# Patient Record
Sex: MALE | Race: WHITE
[De-identification: names, ages, dates, MRNs, and addresses within clinical notes are randomized per-mention and may not be internally consistent; named-entity substitution may affect disease eponyms.]

---

## 2017-04-02 NOTE — EDM.PDOC
ED HPI Skin/Rash





- General


Chief Complaint: Skin Complaint


Stated Complaint: POSS RASH


Time Seen by Provider: 04/02/17 21:50





- History of Present Illness


INITIAL COMMENTS - FREE TEXT/NARRATIVE: 





37-year-old male presents emergency room with a couple complaints.





First one is he is developing a boil on his right Buttocks. This has been 

getting more tender over the last several days he had a smaller lesion on his 

anterior chest that is resolving on its own. Patient has had problems with 

these in the past usually in very in areas the patient is been taking 

cephalexin on an as needed basis for the last 10 years they are not working 

anymore. The patient gets these at various places at one point he was scheduled 

for surgery for one however it drained spontaneously on its own. The patient 

has no local physician as he does not need to go to the doctor.





Complaint #2 is he is having a worsening hemorrhoid. Patient denies any 

constipation. This is been acting up for the last 4 or 5 days





- Related Data


 Allergies











Allergy/AdvReac Type Severity Reaction Status Date / Time


 


No Known Allergies Allergy   Verified 05/11/14 14:49











Home Meds: 


 Ambulatory Orders











 Medication  Instructions  Recorded  Confirmed


 


Hydrocortisone [Anusol-HC] 30 gm RC TID #1 cream..g. 04/02/17 


 


Sulfamethoxazole/Trimethoprim 1 each PO BID #14 tablet 04/02/17 





[Bactrim Ds Tablet]   














Past Medical History





- Past Health History


Medical/Surgical History: Denies Medical/Surgical History





Social & Family History





- Tobacco Use


Years of Tobacco use: 15





- Alcohol Use


Days Per Week of Alcohol Use: 2


Number of Drinks Per Day: 5


Total Drinks Per Week: 10





- Recreational Drug Use


Recreational Drug Use: No





ED ROS GENERAL





- Review of Systems


Review Of Systems: See Below


Constitutional: Reports: no symptoms


Respiratory: Reports: No Symptoms


Cardiovascular: Reports: No symptoms


GI/Abdominal: Reports: No symptoms


: Reports: no symptoms





ED EXAM, SKIN/RASH


Exam: See Below


Exam Limited By: No limitations


General Appearance: alert, no apparent distress


Respiratory/Chest: lungs clear, normal breath sounds, no accessory muscle use


Cardiovascular: regular rate, rhythm, no edema, no murmur


GI/Abdominal: normal bowel sounds, soft, non tender


Rectal (Males) Exam: Other (Patient has a left-sided external hemorrhoid that 

is quite tender nonthrombosed at this time rectal exam is otherwise 

unremarkable no fissures or internal hemorrhoids noted)


Back Exam: normal inspection.  No: CVA tenderness (L), CVA tenderness (R)


Skin: Other (Patient a healing lesion on his lower anterior chest. On his right 

buttocks he has an area of fullness and tenderness mild erythema and warmth no 

fluctuation at this time. This does not involve the gluteal fold.)





Course





- Vital Signs


Last Recorded V/S: 


 Last Vital Signs











Temp  36.8 C   04/02/17 21:53


 


Pulse  81   04/02/17 21:53


 


Resp  16   04/02/17 21:53


 


BP  128/81   04/02/17 21:53


 


Pulse Ox  97   04/02/17 21:53














- Orders/Labs/Meds


Orders: 


 Active Orders 24 hr











 Category Date Time Status


 


 Hemoccult [OCCULT BLOOD DIAGNOSTIC] [OP] Stat Lab  04/02/17 22:24 Uncollected











Meds: 


Medications














Discontinued Medications














Generic Name Dose Route Start Last Admin





  Trade Name Freq  PRN Reason Stop Dose Admin


 


Trimethoprim/Sulfamethoxazole  1 tab  04/02/17 22:11  04/02/17 22:16





  Septra Ds  PO  04/02/17 22:12  1 tab





  ONETIME ONE   Administration














- Re-Assessments/Exams


Free Text/Narrative Re-Assessment/Exam: 





04/02/17 22:30


The patient has 2 processes going on at this time he has an external hemorrhoid 

and he has what could develop into a abscess on his buttocks. He's been on 

chronic cephalexin. This will be changed to Bactrim DS at this point for 7 

days. He'll be started on Anusol HC for his hemorrhoid. He is strongly advised 

to followup in the clinic.





Departure





- Departure


Time of Disposition: 22:07


Disposition: Home, Self-Care 01


Clinical Impression: 


 Abscess of right buttock, External hemorrhoid





Prescriptions: 


Hydrocortisone [Anusol-HC] 30 gm RC TID #1 cream..g.


Sulfamethoxazole/Trimethoprim [Bactrim Ds Tablet] 1 each PO BID #14 tablet


Referrals: 


PCP,None [Primary Care Provider] - 


Forms:  ED Department Discharge


Additional Instructions: 


Return to emergency room with any questions or problems.





You to start on Bactrim DS this is an antibiotic take one twice daily until 

gone he did received your first dose in the emergency room.





Tonight  an over-the-counter hemorrhoid preparation and try this.





Tomorrow  the prescription for the Anusol HC. This is a topical strong 

hemorrhoid preparation apply this 3 times a day for 10 days.





Either sitz baths or warm moist heat to both areas for 20 minutes every couple 

hours while you're awake.





Followup in the hospital clinic in 3 days for recheck. [phone]





- My Orders


Last 24 Hours: 


My Active Orders





04/02/17 22:24


Hemoccult [OCCULT BLOOD DIAGNOSTIC] [OP] Stat 














- Assessment/Plan


Last 24 Hours: 


My Active Orders





04/02/17 22:24


Hemoccult [OCCULT BLOOD DIAGNOSTIC] [OP] Stat

## 2022-08-29 ENCOUNTER — HOSPITAL ENCOUNTER (EMERGENCY)
Dept: HOSPITAL 41 - JD.ED | Age: 42
End: 2022-08-29
Payer: SELF-PAY

## 2022-08-29 DIAGNOSIS — Z53.21: Primary | ICD-10-CM

## 2022-08-30 ENCOUNTER — HOSPITAL ENCOUNTER (EMERGENCY)
Dept: HOSPITAL 41 - JD.ED | Age: 42
Discharge: HOME | End: 2022-08-30
Payer: COMMERCIAL

## 2022-08-30 VITALS — DIASTOLIC BLOOD PRESSURE: 89 MMHG | HEART RATE: 75 BPM | SYSTOLIC BLOOD PRESSURE: 136 MMHG
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
## 2022-08-30 DIAGNOSIS — S61.212A: Primary | ICD-10-CM

## 2022-08-30 DIAGNOSIS — L08.9: ICD-10-CM

## 2022-08-30 DIAGNOSIS — Z23: ICD-10-CM

## 2022-08-30 DIAGNOSIS — F17.210: ICD-10-CM

## 2022-08-30 PROCEDURE — 85025 COMPLETE CBC W/AUTO DIFF WBC: CPT

## 2022-08-30 PROCEDURE — 96375 TX/PRO/DX INJ NEW DRUG ADDON: CPT

## 2022-08-30 PROCEDURE — 36415 COLL VENOUS BLD VENIPUNCTURE: CPT

## 2022-08-30 PROCEDURE — 90715 TDAP VACCINE 7 YRS/> IM: CPT

## 2022-08-30 PROCEDURE — 73140 X-RAY EXAM OF FINGER(S): CPT

## 2022-08-30 PROCEDURE — 96365 THER/PROPH/DIAG IV INF INIT: CPT

## 2022-08-30 PROCEDURE — 86140 C-REACTIVE PROTEIN: CPT

## 2022-08-30 PROCEDURE — 90471 IMMUNIZATION ADMIN: CPT

## 2022-08-30 PROCEDURE — 99283 EMERGENCY DEPT VISIT LOW MDM: CPT
